# Patient Record
Sex: MALE | Race: WHITE | NOT HISPANIC OR LATINO | Employment: UNEMPLOYED | URBAN - METROPOLITAN AREA
[De-identification: names, ages, dates, MRNs, and addresses within clinical notes are randomized per-mention and may not be internally consistent; named-entity substitution may affect disease eponyms.]

---

## 2020-08-05 ENCOUNTER — TELEPHONE (OUTPATIENT)
Dept: PEDIATRIC CARDIOLOGY | Facility: CLINIC | Age: 35
End: 2020-08-05

## 2020-08-17 ENCOUNTER — DOCUMENTATION ONLY (OUTPATIENT)
Dept: TRANSPLANT | Facility: HOSPITAL | Age: 35
End: 2020-08-17

## 2020-08-17 NOTE — PROGRESS NOTES
"I reviewed medical records on this patient.  By report, he is being referred for erythrocytosis and mild pulmonary hypertension".  On review of very limited medical records, he appears to have Down syndrome.  No lab work was included in the medical record sent.    Patient is being referred by Dr. Diallo Cardenas, cardiologist at [facility] in [place].  Primary care doctor is Dr. Jacky Olivera.    I reviewed all the images from an echocardiogram performed at [facility] on June 30, 2020.  My interpretation is as follows:  1.  Excellent biventricular systolic function  2.  The right ventricle and right atrium appear mildly enlarged.  3.  There appears to be a perimembranous ventricular septal defect with accessory tricuspid valve closing the majority of the defect.  A windsock like membranous septum is noted.  Some views make it look like there is a small residual ventricular septal defect although this is not definitive.  4.  Mild to moderate tricuspid insufficiency with a peak velocity around 3 m/sec.  5.  No evidence of pulmonary stenosis with peak velocity across the pulmonary valve 1.5 m/sec.  6.  Aortic valve and mitral valve looked normal.  7.  IVC is of normal size and collapses with inspiration suggesting normal right atrial pressure.  8.  No obvious atrial septal defect, but imaging is suboptimal.  9.  The aortic arch is not shown.  The branch pulmonary arteries are not visualized.  10.  During the echocardiogram, he has frequent dropped beats that appear to be either a type 1 or type 2 second-degree heart block with an underlying first-degree heart block.    A recent EKG that is scanned under media appears to show a pronounced type 1 second-degree heart block versus complete heart block.  However, a Holter report from July 6, 2020 was read as normal with a low heart rate of 35 beats per minute during sleep.    We will get him into [facility] Clinic.  1.  He needs a repeat echocardiogram.  Imaging is not sufficient to rule out coarctation of the aorta or a ductus arteriosus.  I also cannot rule out a residual perimembranous ventricular septal defect.  2.  We will repeat an EKG.  3.  He has a reported history of erythrocytosis.  I do not have any of that blood work.  Macrocytosis is common in Down syndrome, and they do tend to run a higher hematocrit although typically not in the abnormal range.  We will attempt to get a hold of his recent blood work.

## 2020-09-01 ENCOUNTER — TELEPHONE (OUTPATIENT)
Dept: PEDIATRIC CARDIOLOGY | Facility: CLINIC | Age: 35
End: 2020-09-01

## 2020-09-01 ENCOUNTER — DOCUMENTATION ONLY (OUTPATIENT)
Dept: PEDIATRIC CARDIOLOGY | Facility: HOSPITAL | Age: 35
End: 2020-09-01

## 2020-09-01 NOTE — PROGRESS NOTES
I was able to review work drawn July 15, 2020 at 12:51 p.m..  He was ordered by his primary care physician.  The results are scanned under media.  White blood cell count 5.7, hemoglobin and hematocrit at the upper limits of normal at 16.5 and 49.2.  Platelet count 179.  Ninety-six.  Sodium 142, potassium 5, but noted to have some hemolysis.  Chloride 102, CO2 26, BUN 11, creatinine 1.13, platelet count 77.  Calcium 9.3, albumin 4.5.  Bilirubin 0.4.  AST and ALT normal at 39 and 34.

## 2020-09-01 NOTE — TELEPHONE ENCOUNTER
Attempt to schedule appointment with Dr. Blandon ( echo/ekg) in [facility] clinic. No voicemail at this time.

## 2020-09-09 ENCOUNTER — TELEPHONE (OUTPATIENT)
Dept: CARDIOLOGY | Facility: CLINIC | Age: 35
End: 2020-09-09

## 2020-09-09 DIAGNOSIS — Q21.0 VSD (VENTRICULAR SEPTAL DEFECT), PERIMEMBRANOUS: Primary | ICD-10-CM

## 2020-09-09 NOTE — TELEPHONE ENCOUNTER
Spoke with Aga from the [facility]. Scheduled Cesar appointment for Oct 15 start time 2 pm. Appointment slip mailed to confirmed address on file. Aga verbalized understanding all information provided.

## 2020-09-14 ENCOUNTER — TELEPHONE (OUTPATIENT)
Dept: PEDIATRIC CARDIOLOGY | Facility: CLINIC | Age: 35
End: 2020-09-14

## 2020-09-14 NOTE — TELEPHONE ENCOUNTER
Informed Stacey that all testing including the visit with Dr. Blandon will take approximately 1 hour and 30 mins to 2 hours.  Stacey verbalized understanding all information provided.   ----- Message from Helen Velasquez sent at 9/14/2020  9:54 AM CDT -----  Contact: Ericka from [email or web address]  [phone]  Cesar has an appt for a procedure on 10/12/20.Ericka Andino  from  [email or web address] would like a call back to let her know how long the procedure will take.

## 2020-10-06 DIAGNOSIS — Q21.0 VSD (VENTRICULAR SEPTAL DEFECT): Primary | ICD-10-CM

## 2020-10-10 NOTE — PROGRESS NOTES
10/12/2020     re:Cesar Harman  :1985     [facility]   Nicolas Cardenas  [facility]    [facility]    Dear Andrea Cardenas and Joselin:    Cesar Harman is a 35 y.o. male seen today in my Adult [facility]ital [facility] Disease Clinic in [place].  To summarize, his diagnoses are as follows:  1.  Perimembranous VSD likely completely closed with tricuspid valve tissue  2.  Right ventricular enlargement (and RA enlargement) of unclear etiology  3.  Down syndrome   4.  No evidence of pulmonary hypertension on echo today  5.  Likely Mobitz I second degree AV block  6.  Possible significant tricuspid insufficiency (not well seen on my echo, but looked mild to moderate on Dr. Cardenas's echo)    My recommendations are as follows:  1.  At a minimum, follow up with me in 1 year in my main campus [place] clinic with a repeat echo and ekg  2.  Will attempt to get records from prior care in [place]  3.  No need for SBE prophylaxis  4.  close follow up with PCP regarding screening for thyroid disease    Discussion:  He does appear to have a VSD, but it looks completely closed by tricuspid valve tissue (as often occurs).  My major concern is his right heart enlargement.  I don't see a secundum ASD.  We certainly could be missing partial anomalous pulmonary venous return or a sinus venosus defect (less likely).  However, I doubt those diagnoses given his past history.  His previous echo (reviewed from Dr. Cardenas) suggested mild to moderate TR, so perhaps the right heart enlargement is due to that.  We could consider a MARCO or MRI, but he would definitely require general anesthesia for both of those studies, and at present, I don't think it is worth it.  I highly doubt that we would recommend surgical intervention even if an abnormality was noted.  I will repeat echo imaging in a year, but we will have him come to  [place] where I suspect we can get better imaging.    History of present illness:  I spoke over the phone to his mother, Yue Healy, at [phone] and I reviewed limited data from his group home (he wa brought to clinic by workers from the home).  Shortly after birth, he was diagnosed with a VSD and a leaky heart valve and placed on digoxin ([facility] in [place]).  However, by 2 months of age the digoxin was stopped.  He had 2 heart caths as a child, and they were told the VSD had closed.  He followed with doctors in [place] and [place].  No surgical intervention has been necessary.  Mom feels that he is less active than he was a few years ago, but otherwise no complaints.  No shortness of breath, syncope, edema, cyanosis, dyspnea on exertion.    I reviewed all the images from an echocardiogram performed at [facility] on June 30, 2020.  My interpretation is as follows:  1.  Excellent biventricular systolic function  2.  The right ventricle and right atrium appear mildly enlarged.  3.  There appears to be a perimembranous ventricular septal defect with accessory tricuspid valve closing the majority of the defect.  A windsock like membranous septum is noted.  Some views make it look like there is a small residual ventricular septal defect although this is not definitive.  4.  Mild to moderate tricuspid insufficiency with a peak velocity around 3 m/sec.  5.  No evidence of pulmonary stenosis with peak velocity across the pulmonary valve 1.5 m/sec.  6.  Aortic valve and mitral valve looked normal.  7.  IVC is of normal size and collapses with inspiration suggesting normal right atrial pressure.  8.  No obvious atrial septal defect, but imaging is suboptimal.  9.  The aortic arch is not shown.  The branch pulmonary arteries are not visualized.  10.  During the echocardiogram, he has frequent dropped beats that appear to be either a type 1 or type 2 second-degree  heart block with an underlying first-degree heart block.     A recent EKG that is scanned under media appears to show a pronounced type 1 second-degree heart block versus complete heart block.  However, a Holter report from July 6, 2020 was read as normal with a low heart rate of 35 beats per minute during sleep.    I was able to review work drawn July 15, 2020 at 12:51 p.m..  He was ordered by his primary care physician.  The results are scanned under media.  White blood cell count 5.7, hemoglobin and hematocrit at the upper limits of normal at 16.5 and 49.2.  Platelet count 179.  Sodium 142, potassium 5, but noted to have some hemolysis.  Chloride 102, CO2 26, BUN 11, creatinine 1.13, platelet count 77.  Calcium 9.3, albumin 4.5.  Bilirubin 0.4.  AST and ALT normal at 39 and 34.    Past Medical History:   Diagnosis Date    Down syndrome     High cholesterol     Mitral valve insufficiency      Past Surgical History:   Procedure Laterality Date    COMBINED RIGHT AND RETROGRADE LEFT HEART CATHETERIZATION FOR CONGENITAL HEART DEFECT      Heart cath x2 - last @ 1 y/o per mom     STRABISMUS SURGERY      TYMPANOSTOMY TUBE PLACEMENT       History reviewed. No pertinent family history.  Social History     Socioeconomic History    Marital status: Unknown     Spouse name: Not on file    Number of children: Not on file    Years of education: Not on file    Highest education level: Not on file   Occupational History    Not on file   Social Needs    Financial resource strain: Not on file    Food insecurity     Worry: Not on file     Inability: Not on file    Transportation needs     Medical: Not on file     Non-medical: Not on file   Tobacco Use    Smoking status: Not on file   Substance and Sexual Activity    Alcohol use: Not on file    Drug use: Not on file    Sexual activity: Not on file   Lifestyle    Physical activity     Days per week: Not on file     Minutes per session: Not on file    Stress: Not on  "file   Relationships    Social connections     Talks on phone: Not on file     Gets together: Not on file     Attends [employer] service: Not on file     Active member of club or organization: Not on file     Attends meetings of clubs or organizations: Not on file     Relationship status: Not on file   Other Topics Concern    Not on file   Social History Narrative    Lives in group home     Current Outpatient Medications on File Prior to Visit   Medication Sig Dispense Refill    amoxicillin (AMOXIL) 500 MG capsule Take 500 mg by mouth daily as needed.      cetirizine (ZYRTEC) 10 MG tablet Take 10 mg by mouth Daily.      fluticasone propionate (FLONASE) 50 mcg/actuation nasal spray 1 spray by Nasal route Daily.      ipratropium (ATROVENT) 42 mcg (0.06 %) nasal spray 1 spray by Nasal route Daily.      polyethylene glycol (GLYCOLAX) 17 gram/dose powder Take 17 g by mouth daily as needed.      risperiDONE (RISPERDAL) 2 MG tablet Take 2 mg by mouth every evening.      risperiDONE (RISPERDAL) 3 MG Tab Take 3 mg by mouth Daily.      salicylic acid 2 % Clsr Apply 1 applicator topically Daily.      simvastatin (ZOCOR) 20 MG tablet Take 20 mg by mouth Daily.      tolnaftate (TINACTIN) 1 % powder Apply 1 application topically daily as needed.      white petrolatum ointment Apply 1 applicator topically daily as needed.       No current facility-administered medications on file prior to visit.      Review of patient's allergies indicates:  No Known Allergies     Vitals:    10/12/20 1255   BP: 131/85   BP Location: Right arm   Patient Position: Sitting   BP Method: Medium (Automatic)   Pulse: 83   Resp: 18   SpO2: 98%   Weight: 77.1 kg (170 lb)   Height: 5' 3.98" (1.625 m)     In general, he is a very healthy-appearing dysmorphic male in no apparent distress.  He has Down syndrome.  QUite delayed - nonverbal.  The eyes, nares, and oropharynx are clear.  Eyelids and conjunctiva are normal without drainage or erythema.  " Pupils equal and round bilaterally.  The head is normocephalic and atraumatic.  The neck is supple without jugular venous distention or thyroid enlargement.  The lungs are clear to auscultation bilaterally.  There are no scars on the chest wall.  The first and second heart sounds are normal.  There are no  gallops, rubs, or clicks in the seated position.  2/6 low pitched systolic murmur at LLSB.  The abdominal exam is benign without hepatosplenomegaly, tenderness, or distention.  Pulses are normal in all 4 extremities with brisk capillary refill and no clubbing, cyanosis, or edema.  No rashes are noted.    I personally reviewed the following tests performed today and my interpretation follows:  EKG with normal sinus rhythm alternating with type I second degree AVB.  Echo:  Dilated right ventricle, moderate.  Mild right atrial enlargement.  There is a perimembranous VSD that appears to be completely closed by aneurysmal tricuspid valve tissue. No residual shunt  definitively shown, but imaging not adequate to rule out a tiny shunt.  Trivial tricuspid insufficiency estimates RV systolic pressure around 25 mmHg greater than the RA pressure  No aortic valve insufficiency.  Small IVC with inspiratory collapse suggests normal RA pressure  No evidence of a secundum ASD. Imaging not adequate to rule out partial anomalous pulmonary venous return or another type  of atrial level shunt.  Normal left ventricular systolic and diastolic function.  Normal right ventricular systolic function.    Thank you for referring this patient to our clinic.  Please call with any questions.    Sincerely,        Thomas Blandon MD  Pediatric Cardiology  Adult Congenital Heart Disease  Pediatric Heart Failure and Transplantation  [facility] [address] [place]  [address]  [phone]

## 2020-10-12 ENCOUNTER — OFFICE VISIT (OUTPATIENT)
Dept: PEDIATRIC CARDIOLOGY | Facility: CLINIC | Age: 35
End: 2020-10-12
Payer: MEDICAID

## 2020-10-12 ENCOUNTER — CLINICAL SUPPORT (OUTPATIENT)
Dept: PEDIATRIC CARDIOLOGY | Facility: CLINIC | Age: 35
End: 2020-10-12
Payer: MEDICAID

## 2020-10-12 VITALS
SYSTOLIC BLOOD PRESSURE: 131 MMHG | BODY MASS INDEX: 29.02 KG/M2 | HEART RATE: 83 BPM | DIASTOLIC BLOOD PRESSURE: 85 MMHG | OXYGEN SATURATION: 98 % | WEIGHT: 170 LBS | RESPIRATION RATE: 18 BRPM | HEIGHT: 64 IN

## 2020-10-12 DIAGNOSIS — Q21.0 VSD (VENTRICULAR SEPTAL DEFECT): ICD-10-CM

## 2020-10-12 DIAGNOSIS — Q24.9 ADULT CONGENITAL HEART DISEASE: ICD-10-CM

## 2020-10-12 DIAGNOSIS — I51.7 RIGHT HEART ENLARGEMENT: ICD-10-CM

## 2020-10-12 DIAGNOSIS — Q90.9 DOWN SYNDROME: ICD-10-CM

## 2020-10-12 PROCEDURE — 93000 ELECTROCARDIOGRAM COMPLETE: CPT | Mod: S$GLB,,, | Performed by: PEDIATRICS

## 2020-10-12 PROCEDURE — 99204 PR OFFICE/OUTPT VISIT, NEW, LEVL IV, 45-59 MIN: ICD-10-PCS | Mod: 25,S$GLB,, | Performed by: PEDIATRICS

## 2020-10-12 PROCEDURE — 99204 OFFICE O/P NEW MOD 45 MIN: CPT | Mod: 25,S$GLB,, | Performed by: PEDIATRICS

## 2020-10-12 PROCEDURE — 93000 EKG 12-LEAD PEDIATRIC: ICD-10-PCS | Mod: S$GLB,,, | Performed by: PEDIATRICS

## 2020-10-12 RX ORDER — IPRATROPIUM BROMIDE 42 UG/1
1 SPRAY, METERED NASAL DAILY
COMMUNITY
Start: 2020-09-28

## 2020-10-12 RX ORDER — RISPERIDONE 3 MG/1
3 TABLET ORAL DAILY
COMMUNITY
Start: 2020-09-25 | End: 2024-01-22 | Stop reason: ALTCHOICE

## 2020-10-12 RX ORDER — POLYETHYLENE GLYCOL 3350 17 G/17G
17 POWDER, FOR SOLUTION ORAL DAILY PRN
COMMUNITY

## 2020-10-12 RX ORDER — FLUTICASONE PROPIONATE 50 MCG
1 SPRAY, SUSPENSION (ML) NASAL DAILY
COMMUNITY

## 2020-10-12 RX ORDER — RISPERIDONE 2 MG/1
2 TABLET ORAL NIGHTLY
COMMUNITY
End: 2024-01-22 | Stop reason: ALTCHOICE

## 2020-10-12 RX ORDER — TOLNAFTATE 1 G/100G
1 POWDER TOPICAL DAILY PRN
COMMUNITY
Start: 2020-09-17

## 2020-10-12 RX ORDER — PETROLATUM,WHITE
1 OINTMENT IN PACKET (GRAM) TOPICAL DAILY PRN
COMMUNITY

## 2020-10-12 RX ORDER — SIMVASTATIN 20 MG/1
20 TABLET, FILM COATED ORAL DAILY
COMMUNITY

## 2020-10-12 RX ORDER — CETIRIZINE HYDROCHLORIDE 10 MG/1
10 TABLET ORAL DAILY
COMMUNITY

## 2020-10-12 RX ORDER — AMOXICILLIN 500 MG/1
500 CAPSULE ORAL DAILY PRN
COMMUNITY
End: 2024-01-22

## 2021-09-07 ENCOUNTER — TELEPHONE (OUTPATIENT)
Dept: PEDIATRIC CARDIOLOGY | Facility: CLINIC | Age: 36
End: 2021-09-07

## 2021-10-11 DIAGNOSIS — Q24.9 ADULT CONGENITAL HEART DISEASE: Primary | ICD-10-CM

## 2021-10-11 DIAGNOSIS — I51.7 RIGHT HEART ENLARGEMENT: ICD-10-CM

## 2021-10-11 DIAGNOSIS — Q21.0 VSD (VENTRICULAR SEPTAL DEFECT): ICD-10-CM

## 2021-10-11 DIAGNOSIS — Q90.9 DOWN SYNDROME: ICD-10-CM

## 2021-10-12 DIAGNOSIS — I51.7 RIGHT HEART ENLARGEMENT: ICD-10-CM

## 2021-10-12 DIAGNOSIS — Q24.9 ADULT CONGENITAL HEART DISEASE: Primary | ICD-10-CM

## 2021-10-12 DIAGNOSIS — Q21.0 VSD (VENTRICULAR SEPTAL DEFECT): ICD-10-CM

## 2021-12-02 ENCOUNTER — TELEPHONE (OUTPATIENT)
Dept: PEDIATRIC CARDIOLOGY | Facility: CLINIC | Age: 36
End: 2021-12-02
Payer: MEDICAID

## 2022-01-25 ENCOUNTER — TELEPHONE (OUTPATIENT)
Dept: PEDIATRIC CARDIOLOGY | Facility: CLINIC | Age: 37
End: 2022-01-25
Payer: MEDICAID

## 2022-01-25 NOTE — TELEPHONE ENCOUNTER
Appointment schedule in [place] on Feb 23 and March 21. Appointment slip mailed to confirmed address on file. Ms Ibarra verbalized understanding all information provided        ----- Message -----  From: Petty Knight RN  Sent: 1/24/2022   9:18 AM CST  To: [facility] Staff  Subject: appt change request                              Caregiver for Gonz[facility]lez called and asked could his appt please be changed to [facility]- they are short staffed and unable to come to [facility].  Please call Ms Ibarra, [phone].

## 2022-02-21 DIAGNOSIS — Q24.9 ADULT CONGENITAL HEART DISEASE: ICD-10-CM

## 2022-02-21 DIAGNOSIS — Q21.0 VSD (VENTRICULAR SEPTAL DEFECT): Primary | ICD-10-CM

## 2022-02-23 ENCOUNTER — CLINICAL SUPPORT (OUTPATIENT)
Dept: PEDIATRIC CARDIOLOGY | Facility: CLINIC | Age: 37
End: 2022-02-23
Payer: MEDICAID

## 2022-02-23 DIAGNOSIS — Q21.0 VSD (VENTRICULAR SEPTAL DEFECT): ICD-10-CM

## 2022-02-23 DIAGNOSIS — Q24.9 ADULT CONGENITAL HEART DISEASE: ICD-10-CM

## 2022-03-20 NOTE — PROGRESS NOTES
2022     re:Cesar Harman  :1985     [facility]   Nicolas Cardenas  [facility]    Dr. Bryan Picou [facility]    Dear Andrea Cardenas and Joselin:    Cesar Harman is a 36 y.o. male seen today in my Adult [facility] Clinic in [place].  To summarize, his diagnoses are as follows:  1.  Perimembranous VSD likely completely closed with tricuspid valve tissue  2.  Right ventricular enlargement (and RA enlargement) of unclear etiology  - no echocardiographic evidence of ASD or PAPVR, no residual shunting noted on catheterization in the past  - No evidence of significant pulmonary hypertension on echo  - concerns in the past about significant tricuspid insufficiency, but looks trivial to mild on current echo  3.  Down syndrome   4.  Likely Mobitz I second degree AV block    My recommendations are as follows:  1.  At a minimum, follow up with me in 2 years in my main campus [place] clinic with a repeat echo and ekg.  I want to see him in [place] to allow better imaging of the atrial septum.  2.  No need for SBE prophylaxis  3.  close follow up with PCP regarding screening for thyroid disease, regular care.  4.  Any true syncope should prompt a follow up with likely holter monitoring given his baseline bradycardia.    Discussion:  He does appear to have had a VSD, but it looks completely closed by tricuspid valve tissue (as often occurs).  My major concern is his right heart enlargement.  I don't see a secundum ASD.  We certainly could be missing partial anomalous pulmonary venous return or a sinus venosus defect (less likely).  However, I doubt those diagnoses given his past history.  His previous echo (reviewed from Dr. Cardenas) suggested mild to moderate TR, but the echo today suggests more trivial TR.  We could consider a MARCO or MRI, but he would definitely require general anesthesia for  both of those studies, and at present, I don't think it is worth it.  I highly doubt that we would recommend surgical intervention even if an abnormality was noted.  I will repeat echo imaging in 2 years, but we will have him come to [place] where I suspect we can get better imaging.    His episodes of behavior change (back arching, eye rolling, but still able to answer questions) are likely not cardiac.  They don't sound consistent with an arrhythmia, especially given that they occur when he is frustrated.  I suspect that this is more behavioral, but I recommend follow up with his PCP.     History of present illness:  He has done well since I last saw him.  No syncope, shortness of breath, chest pain.  No complaint of palpitations.  He does have episodes, typically when he is upset about something, where he will arch his back and his eyes roll back.  He is very quiet during these episodes, which can last for a few hours, but he can answer questions appropriately.  No syncope.  He did trip once during one of these episodes, but he can walk.  No associated incontinence.    I first saw him October 2020.  At that time, I obtained the following history:  I spoke over the phone to his mother, Yue Healy, at [phone] and I reviewed limited data from his group home (he wa brought to clinic by workers from the home).  Shortly after birth, he was diagnosed with a VSD and a leaky heart valve and placed on digoxin ([facility] in [place]).  However, by 2 months of age the digoxin was stopped.  He had 2 heart caths as a child, and they were told the VSD had closed.  He followed with doctors in [place] and [place].  No surgical intervention has been necessary.  Mom feels that he is less active than he was a few years ago, but otherwise no complaints.  No shortness of breath, syncope, edema, cyanosis, dyspnea on exertion.    I reviewed all the images from an echocardiogram performed at [place]  [facility] on June 30, 2020.  My interpretation is as follows:  1.  Excellent biventricular systolic function  2.  The right ventricle and right atrium appear mildly enlarged.  3.  There appears to be a perimembranous ventricular septal defect with accessory tricuspid valve closing the majority of the defect.  A windsock like membranous septum is noted.  Some views make it look like there is a small residual ventricular septal defect although this is not definitive.  4.  Mild to moderate tricuspid insufficiency with a peak velocity around 3 m/sec.  5.  No evidence of pulmonary stenosis with peak velocity across the pulmonary valve 1.5 m/sec.  6.  Aortic valve and mitral valve looked normal.  7.  IVC is of normal size and collapses with inspiration suggesting normal right atrial pressure.  8.  No obvious atrial septal defect, but imaging is suboptimal.  9.  The aortic arch is not shown.  The branch pulmonary arteries are not visualized.  10.  During the echocardiogram, he has frequent dropped beats that appear to be either a type 1 or type 2 second-degree heart block with an underlying first-degree heart block.    I was able to review work drawn July 15, 2020 at 12:51 p.m..  He was ordered by his primary care physician.  The results are scanned under media.  White blood cell count 5.7, hemoglobin and hematocrit at the upper limits of normal at 16.5 and 49.2.  Platelet count 179.  Sodium 142, potassium 5, but noted to have some hemolysis.  Chloride 102, CO2 26, BUN 11, creatinine 1.13, platelet count 77.  Calcium 9.3, albumin 4.5.  Bilirubin 0.4.  AST and ALT normal at 39 and 34.    Past Medical History:   Diagnosis Date    Down syndrome     High cholesterol     Mitral valve insufficiency      Past Surgical History:   Procedure Laterality Date    COMBINED RIGHT AND RETROGRADE LEFT HEART CATHETERIZATION FOR CONGENITAL HEART DEFECT      Heart cath x2 - last @ 1 y/o per mom     STRABISMUS SURGERY       "TYMPANOSTOMY TUBE PLACEMENT       History reviewed. No pertinent family history.  Social History     Socioeconomic History    Marital status: Unknown   Tobacco Use    Smoking status: Never Smoker   Social History Narrative    Lives in group home     Current Outpatient Medications on File Prior to Visit   Medication Sig Dispense Refill    amoxicillin (AMOXIL) 500 MG capsule Take 500 mg by mouth daily as needed.      cetirizine (ZYRTEC) 10 MG tablet Take 10 mg by mouth Daily.      fluticasone propionate (FLONASE) 50 mcg/actuation nasal spray 1 spray by Nasal route Daily.      ipratropium (ATROVENT) 42 mcg (0.06 %) nasal spray 1 spray by Nasal route Daily.      polyethylene glycol (GLYCOLAX) 17 gram/dose powder Take 17 g by mouth daily as needed.      risperiDONE (RISPERDAL) 2 MG tablet Take 2 mg by mouth every evening.      salicylic acid 2 % Clsr Apply 1 applicator topically Daily.      simvastatin (ZOCOR) 20 MG tablet Take 20 mg by mouth Daily.      tolnaftate (TINACTIN) 1 % powder Apply 1 application topically daily as needed.      white petrolatum ointment Apply 1 applicator topically daily as needed.      benzoyl peroxide (BENZAC AC) 10 % external wash Apply topically.      clindamycin (CLEOCIN T) 1 % lotion Apply topically.      risperiDONE (RISPERDAL) 3 MG Tab Take 3 mg by mouth Daily.       No current facility-administered medications on file prior to visit.     Review of patient's allergies indicates:  No Known Allergies     Vitals:    03/21/22 1033   BP: 132/75   BP Location: Left arm   Patient Position: Sitting   BP Method: Large (Automatic)   Pulse: (!) 50   Resp: 20   SpO2: 100%   Weight: 81.2 kg (179 lb)   Height: 5' 3.98" (1.625 m)     In general, he is a very healthy-appearing dysmorphic male in no apparent distress.  He has Down syndrome.  QUite delayed - nonverbal.  The eyes, nares, and oropharynx are clear.  Eyelids and conjunctiva are normal without drainage or erythema.  Pupils " equal and round bilaterally.  The head is normocephalic and atraumatic.  The neck is supple without jugular venous distention or thyroid enlargement.  The lungs are clear to auscultation bilaterally.  There are no scars on the chest wall.  The first and second heart sounds are normal.  There are no  gallops, rubs, or clicks in the seated position.  1/6 low pitched systolic murmur at LLSB.  The abdominal exam is benign without hepatosplenomegaly, tenderness, or distention.  Pulses are normal in all 4 extremities with brisk capillary refill and no clubbing, cyanosis, or edema.  No rashes are noted.    I personally reviewed the following tests performed today and my interpretation follows:  EKG with sinus bradycardia alternating with type I second degree AVB.  RAD.    Echo 2/23/22:  Occasional ectopy noted  Dilated right ventricle, moderate.  Mild right atrial enlargement.  No aortic valve insufficiency.  Small IVC with inspiratory collapse suggests normal RA pressure  Normal left ventricular systolic and diastolic function.  Normal right ventricular systolic function.  Right ventricle systolic pressure estimate normal.  No evidence of a significant atrial level shunt. At least 2 pulmonary veins seen entering into the LA. No dilation of the  innominate vein to suggest supracardiac PAPVR.  Trivial tricuspid valve insufficiency.  There is a perimembranous VSD that appears to be completely closed by aneurysmal tricuspid valve tissue. No residual shunt seen, but can not completely rule out a tiny shunt. Some posterior malalignment of the outlet ventricular septum with aortic  over-ride, but no LV outflow obstruction.    Thank you for referring this patient to our clinic.  Please call with any questions.    Sincerely,        Thomas Blandon MD  Pediatric Cardiology  Adult Congenital Heart Disease  Pediatric Heart Failure and Transplantation  [facility]  [address] [place]   [address]  [phone]

## 2022-03-21 ENCOUNTER — OFFICE VISIT (OUTPATIENT)
Dept: PEDIATRIC CARDIOLOGY | Facility: CLINIC | Age: 37
End: 2022-03-21
Payer: MEDICAID

## 2022-03-21 VITALS
BODY MASS INDEX: 30.56 KG/M2 | OXYGEN SATURATION: 100 % | DIASTOLIC BLOOD PRESSURE: 75 MMHG | HEIGHT: 64 IN | WEIGHT: 179 LBS | HEART RATE: 50 BPM | RESPIRATION RATE: 20 BRPM | SYSTOLIC BLOOD PRESSURE: 132 MMHG

## 2022-03-21 DIAGNOSIS — Q24.9 ADULT CONGENITAL HEART DISEASE: ICD-10-CM

## 2022-03-21 DIAGNOSIS — Q90.9 DOWN SYNDROME: Primary | ICD-10-CM

## 2022-03-21 DIAGNOSIS — I51.7 RIGHT HEART ENLARGEMENT: ICD-10-CM

## 2022-03-21 DIAGNOSIS — Q21.0 VSD (VENTRICULAR SEPTAL DEFECT): ICD-10-CM

## 2022-03-21 PROCEDURE — 99214 OFFICE O/P EST MOD 30 MIN: CPT | Mod: 25,S$GLB,, | Performed by: PEDIATRICS

## 2022-03-21 PROCEDURE — 3008F PR BODY MASS INDEX (BMI) DOCUMENTED: ICD-10-PCS | Mod: CPTII,S$GLB,, | Performed by: PEDIATRICS

## 2022-03-21 PROCEDURE — 3078F PR MOST RECENT DIASTOLIC BLOOD PRESSURE < 80 MM HG: ICD-10-PCS | Mod: CPTII,S$GLB,, | Performed by: PEDIATRICS

## 2022-03-21 PROCEDURE — 93000 ELECTROCARDIOGRAM COMPLETE: CPT | Mod: S$GLB,,, | Performed by: PEDIATRICS

## 2022-03-21 PROCEDURE — 1159F MED LIST DOCD IN RCRD: CPT | Mod: CPTII,S$GLB,, | Performed by: PEDIATRICS

## 2022-03-21 PROCEDURE — 3075F SYST BP GE 130 - 139MM HG: CPT | Mod: CPTII,S$GLB,, | Performed by: PEDIATRICS

## 2022-03-21 PROCEDURE — 1159F PR MEDICATION LIST DOCUMENTED IN MEDICAL RECORD: ICD-10-PCS | Mod: CPTII,S$GLB,, | Performed by: PEDIATRICS

## 2022-03-21 PROCEDURE — 99214 PR OFFICE/OUTPT VISIT, EST, LEVL IV, 30-39 MIN: ICD-10-PCS | Mod: 25,S$GLB,, | Performed by: PEDIATRICS

## 2022-03-21 PROCEDURE — 3075F PR MOST RECENT SYSTOLIC BLOOD PRESS GE 130-139MM HG: ICD-10-PCS | Mod: CPTII,S$GLB,, | Performed by: PEDIATRICS

## 2022-03-21 PROCEDURE — 3008F BODY MASS INDEX DOCD: CPT | Mod: CPTII,S$GLB,, | Performed by: PEDIATRICS

## 2022-03-21 PROCEDURE — 93000 EKG 12-LEAD PEDIATRIC: ICD-10-PCS | Mod: S$GLB,,, | Performed by: PEDIATRICS

## 2022-03-21 PROCEDURE — 3078F DIAST BP <80 MM HG: CPT | Mod: CPTII,S$GLB,, | Performed by: PEDIATRICS

## 2022-03-21 RX ORDER — BENZOYL PEROXIDE 100 MG/ML
LIQUID TOPICAL
COMMUNITY
Start: 2021-12-08

## 2022-03-21 RX ORDER — CLINDAMYCIN PHOSPHATE 10 UG/ML
LOTION TOPICAL
COMMUNITY
Start: 2021-12-08

## 2023-03-27 ENCOUNTER — TELEPHONE (OUTPATIENT)
Dept: PEDIATRIC CARDIOLOGY | Facility: CLINIC | Age: 38
End: 2023-03-27
Payer: MEDICAID

## 2023-03-27 NOTE — TELEPHONE ENCOUNTER
----- Message from Alessia Hanley sent at 3/27/2023  1:19 PM CDT -----  Contact: mom [phone]  Would like to receive medical advice.    Would they like a call back or a response via [facility]:  Call back     Additional information:  Mom is calling to schedule follow up for pt.. Please call to advise